# Patient Record
(demographics unavailable — no encounter records)

---

## 2025-06-12 NOTE — DISCUSSION/SUMMARY
[FreeTextEntry1] : 1) Chest Pain - ECG obtained today shows NSR - Plan for Exercise stress test - TTE  2) Palpitations  Will obtain 7 day event monitor [EKG obtained to assist in diagnosis and management of assessed problem(s)] : EKG obtained to assist in diagnosis and management of assessed problem(s)

## 2025-06-12 NOTE — ASSESSMENT
[FreeTextEntry1] :  18 year old female with PMH of prior renal transplant (age 16) who presents to establish cardiovascular care after a recent ED visit for chest pain. Patient currently asymptomatic with no recurrence of chest pain or significant palpitations since her ED visit 1 week prior.  Follows with OSH nephrologist for management of renal transplant and has regular bloodwork for monitoring.  Will plan for 7 day event monitor, TTE, and exercise stress test for further evaluation of chest pain and palpitations.

## 2025-06-12 NOTE — HISTORY OF PRESENT ILLNESS
[FreeTextEntry1] : Patient is an 18 year old female with PMH of prior renal transplant (age 16) who presents to establish cardiovascular care after a recent ED visit for chest pain.   Patient states that approx 1 week ago she had sudden onset L sided, reproducible chest pain associated with palpitations and bilateral arm numbness for which she presented to [facility] ED.  Symptoms persisted x 1.5 hours then gradually resolved spontaneously.  Evaluation in the ED showed no significant abnormalities on ECG or CXR and patient was discharged.  Patient has not had any additional symptoms, reports that she does mild-moderate activity regularly without any functional limitations.  ECG performed today with NSR   Prior Studies: Labs 3/6/2025: Lipid panel: , HDL 41, LDL 93, ; BUN/C  20/1.2, K 4.8; tropT <6; A1c 5.7%, H/H 11/32.7, PLTS 270

## 2025-06-12 NOTE — PHYSICAL EXAM
[Well Developed] : well developed [Well Nourished] : well nourished [No Acute Distress] : no acute distress [Normal Conjunctiva] : normal conjunctiva [Normal Venous Pressure] : normal venous pressure [No Carotid Bruit] : no carotid bruit [Clear Lung Fields] : clear lung fields [Good Air Entry] : good air entry [No Respiratory Distress] : no respiratory distress  [Soft] : abdomen soft [Non Tender] : non-tender [Normal Gait] : normal gait [No Edema] : no edema [No Rash] : no rash [No Skin Lesions] : no skin lesions [Moves all extremities] : moves all extremities [No Focal Deficits] : no focal deficits [Normal Speech] : normal speech [Alert and Oriented] : alert and oriented [Normal memory] : normal memory [de-identified] : RRR, S1 and S2, no mrg

## 2025-06-12 NOTE — PHYSICAL EXAM
[Well Developed] : well developed [Well Nourished] : well nourished [No Acute Distress] : no acute distress [Normal Conjunctiva] : normal conjunctiva [Normal Venous Pressure] : normal venous pressure [No Carotid Bruit] : no carotid bruit [Clear Lung Fields] : clear lung fields [Good Air Entry] : good air entry [No Respiratory Distress] : no respiratory distress  [Soft] : abdomen soft [Non Tender] : non-tender [Normal Gait] : normal gait [No Edema] : no edema [No Rash] : no rash [No Skin Lesions] : no skin lesions [Moves all extremities] : moves all extremities [No Focal Deficits] : no focal deficits [Normal Speech] : normal speech [Alert and Oriented] : alert and oriented [Normal memory] : normal memory [de-identified] : RRR, S1 and S2, no mrg